# Patient Record
Sex: MALE | Race: WHITE | NOT HISPANIC OR LATINO | Employment: OTHER | URBAN - METROPOLITAN AREA
[De-identification: names, ages, dates, MRNs, and addresses within clinical notes are randomized per-mention and may not be internally consistent; named-entity substitution may affect disease eponyms.]

---

## 2024-03-31 ENCOUNTER — HOSPITAL ENCOUNTER (EMERGENCY)
Facility: CLINIC | Age: 49
Discharge: HOME | End: 2024-04-01
Attending: STUDENT IN AN ORGANIZED HEALTH CARE EDUCATION/TRAINING PROGRAM | Admitting: STUDENT IN AN ORGANIZED HEALTH CARE EDUCATION/TRAINING PROGRAM
Payer: COMMERCIAL

## 2024-03-31 ENCOUNTER — APPOINTMENT (OUTPATIENT)
Dept: GENERAL RADIOLOGY | Facility: CLINIC | Age: 49
End: 2024-03-31
Attending: STUDENT IN AN ORGANIZED HEALTH CARE EDUCATION/TRAINING PROGRAM
Payer: COMMERCIAL

## 2024-03-31 DIAGNOSIS — J10.1 URI DUE TO INFLUENZA A VIRUS: ICD-10-CM

## 2024-03-31 DIAGNOSIS — R06.6 HICCUPS: ICD-10-CM

## 2024-03-31 LAB
ALBUMIN SERPL BCG-MCNC: 4.4 G/DL (ref 3.5–5.2)
ALP SERPL-CCNC: 47 U/L (ref 40–150)
ALT SERPL W P-5'-P-CCNC: 44 U/L (ref 0–70)
ANION GAP SERPL CALCULATED.3IONS-SCNC: 14 MMOL/L (ref 7–15)
AST SERPL W P-5'-P-CCNC: 22 U/L (ref 0–45)
BASOPHILS # BLD AUTO: 0.1 10E3/UL (ref 0–0.2)
BASOPHILS NFR BLD AUTO: 1 %
BILIRUB SERPL-MCNC: 1.2 MG/DL
BUN SERPL-MCNC: 14.1 MG/DL (ref 6–20)
CALCIUM SERPL-MCNC: 9.1 MG/DL (ref 8.6–10)
CHLORIDE SERPL-SCNC: 103 MMOL/L (ref 98–107)
CREAT SERPL-MCNC: 1.26 MG/DL (ref 0.67–1.17)
DEPRECATED HCO3 PLAS-SCNC: 22 MMOL/L (ref 22–29)
EGFRCR SERPLBLD CKD-EPI 2021: 70 ML/MIN/1.73M2
EOSINOPHIL # BLD AUTO: 0 10E3/UL (ref 0–0.7)
EOSINOPHIL NFR BLD AUTO: 1 %
ERYTHROCYTE [DISTWIDTH] IN BLOOD BY AUTOMATED COUNT: 13.2 % (ref 10–15)
FLUAV RNA SPEC QL NAA+PROBE: POSITIVE
FLUBV RNA RESP QL NAA+PROBE: NEGATIVE
GLUCOSE SERPL-MCNC: 104 MG/DL (ref 70–99)
HCT VFR BLD AUTO: 52.9 % (ref 40–53)
HGB BLD-MCNC: 18.2 G/DL (ref 13.3–17.7)
IMM GRANULOCYTES # BLD: 0 10E3/UL
IMM GRANULOCYTES NFR BLD: 1 %
LIPASE SERPL-CCNC: 35 U/L (ref 13–60)
LYMPHOCYTES # BLD AUTO: 0.7 10E3/UL (ref 0.8–5.3)
LYMPHOCYTES NFR BLD AUTO: 9 %
MCH RBC QN AUTO: 30.5 PG (ref 26.5–33)
MCHC RBC AUTO-ENTMCNC: 34.4 G/DL (ref 31.5–36.5)
MCV RBC AUTO: 89 FL (ref 78–100)
MONOCYTES # BLD AUTO: 0.8 10E3/UL (ref 0–1.3)
MONOCYTES NFR BLD AUTO: 10 %
NEUTROPHILS # BLD AUTO: 6.5 10E3/UL (ref 1.6–8.3)
NEUTROPHILS NFR BLD AUTO: 78 %
NRBC # BLD AUTO: 0 10E3/UL
NRBC BLD AUTO-RTO: 0 /100
NT-PROBNP SERPL-MCNC: 55 PG/ML (ref 0–450)
PLATELET # BLD AUTO: 154 10E3/UL (ref 150–450)
POTASSIUM SERPL-SCNC: 4.1 MMOL/L (ref 3.4–5.3)
PROT SERPL-MCNC: 7.2 G/DL (ref 6.4–8.3)
RBC # BLD AUTO: 5.97 10E6/UL (ref 4.4–5.9)
RSV RNA SPEC NAA+PROBE: NEGATIVE
SARS-COV-2 RNA RESP QL NAA+PROBE: NEGATIVE
SODIUM SERPL-SCNC: 139 MMOL/L (ref 135–145)
TROPONIN T SERPL HS-MCNC: 11 NG/L
WBC # BLD AUTO: 8.1 10E3/UL (ref 4–11)

## 2024-03-31 PROCEDURE — 258N000003 HC RX IP 258 OP 636: Performed by: STUDENT IN AN ORGANIZED HEALTH CARE EDUCATION/TRAINING PROGRAM

## 2024-03-31 PROCEDURE — 85025 COMPLETE CBC W/AUTO DIFF WBC: CPT | Performed by: STUDENT IN AN ORGANIZED HEALTH CARE EDUCATION/TRAINING PROGRAM

## 2024-03-31 PROCEDURE — 84484 ASSAY OF TROPONIN QUANT: CPT | Performed by: STUDENT IN AN ORGANIZED HEALTH CARE EDUCATION/TRAINING PROGRAM

## 2024-03-31 PROCEDURE — 96372 THER/PROPH/DIAG INJ SC/IM: CPT | Mod: XS | Performed by: STUDENT IN AN ORGANIZED HEALTH CARE EDUCATION/TRAINING PROGRAM

## 2024-03-31 PROCEDURE — 96361 HYDRATE IV INFUSION ADD-ON: CPT

## 2024-03-31 PROCEDURE — 80053 COMPREHEN METABOLIC PANEL: CPT | Performed by: STUDENT IN AN ORGANIZED HEALTH CARE EDUCATION/TRAINING PROGRAM

## 2024-03-31 PROCEDURE — 96374 THER/PROPH/DIAG INJ IV PUSH: CPT

## 2024-03-31 PROCEDURE — 93005 ELECTROCARDIOGRAM TRACING: CPT

## 2024-03-31 PROCEDURE — 250N000011 HC RX IP 250 OP 636: Performed by: STUDENT IN AN ORGANIZED HEALTH CARE EDUCATION/TRAINING PROGRAM

## 2024-03-31 PROCEDURE — 71046 X-RAY EXAM CHEST 2 VIEWS: CPT

## 2024-03-31 PROCEDURE — 87637 SARSCOV2&INF A&B&RSV AMP PRB: CPT | Performed by: STUDENT IN AN ORGANIZED HEALTH CARE EDUCATION/TRAINING PROGRAM

## 2024-03-31 PROCEDURE — 99285 EMERGENCY DEPT VISIT HI MDM: CPT | Mod: 25

## 2024-03-31 PROCEDURE — 36415 COLL VENOUS BLD VENIPUNCTURE: CPT | Performed by: STUDENT IN AN ORGANIZED HEALTH CARE EDUCATION/TRAINING PROGRAM

## 2024-03-31 PROCEDURE — 83880 ASSAY OF NATRIURETIC PEPTIDE: CPT | Performed by: STUDENT IN AN ORGANIZED HEALTH CARE EDUCATION/TRAINING PROGRAM

## 2024-03-31 PROCEDURE — 83690 ASSAY OF LIPASE: CPT | Performed by: STUDENT IN AN ORGANIZED HEALTH CARE EDUCATION/TRAINING PROGRAM

## 2024-03-31 RX ORDER — BENZONATATE 100 MG/1
100 CAPSULE ORAL EVERY 8 HOURS PRN
Qty: 12 CAPSULE | Refills: 0 | Status: SHIPPED | OUTPATIENT
Start: 2024-03-31

## 2024-03-31 RX ORDER — CHLORPROMAZINE HYDROCHLORIDE 25 MG/ML
25 INJECTION INTRAMUSCULAR ONCE
Status: COMPLETED | OUTPATIENT
Start: 2024-03-31 | End: 2024-03-31

## 2024-03-31 RX ORDER — KETOROLAC TROMETHAMINE 15 MG/ML
15 INJECTION, SOLUTION INTRAMUSCULAR; INTRAVENOUS ONCE
Status: COMPLETED | OUTPATIENT
Start: 2024-03-31 | End: 2024-03-31

## 2024-03-31 RX ORDER — OSELTAMIVIR PHOSPHATE 75 MG/1
75 CAPSULE ORAL 2 TIMES DAILY
Qty: 10 CAPSULE | Refills: 0 | Status: SHIPPED | OUTPATIENT
Start: 2024-03-31 | End: 2024-04-05

## 2024-03-31 RX ADMIN — KETOROLAC TROMETHAMINE 15 MG: 15 INJECTION, SOLUTION INTRAMUSCULAR; INTRAVENOUS at 22:11

## 2024-03-31 RX ADMIN — SODIUM CHLORIDE 1000 ML: 9 INJECTION, SOLUTION INTRAVENOUS at 22:11

## 2024-03-31 RX ADMIN — CHLORPROMAZINE HYDROCHLORIDE 25 MG: 25 INJECTION INTRAMUSCULAR at 22:36

## 2024-03-31 ASSESSMENT — ACTIVITIES OF DAILY LIVING (ADL)
ADLS_ACUITY_SCORE: 35

## 2024-03-31 ASSESSMENT — COLUMBIA-SUICIDE SEVERITY RATING SCALE - C-SSRS
2. HAVE YOU ACTUALLY HAD ANY THOUGHTS OF KILLING YOURSELF IN THE PAST MONTH?: NO
6. HAVE YOU EVER DONE ANYTHING, STARTED TO DO ANYTHING, OR PREPARED TO DO ANYTHING TO END YOUR LIFE?: NO
1. IN THE PAST MONTH, HAVE YOU WISHED YOU WERE DEAD OR WISHED YOU COULD GO TO SLEEP AND NOT WAKE UP?: NO

## 2024-04-01 VITALS
SYSTOLIC BLOOD PRESSURE: 138 MMHG | BODY MASS INDEX: 34.9 KG/M2 | OXYGEN SATURATION: 92 % | RESPIRATION RATE: 20 BRPM | WEIGHT: 264.55 LBS | TEMPERATURE: 101 F | DIASTOLIC BLOOD PRESSURE: 69 MMHG | HEART RATE: 73 BPM

## 2024-04-01 LAB
ATRIAL RATE - MUSE: 73 BPM
DIASTOLIC BLOOD PRESSURE - MUSE: NORMAL MMHG
INTERPRETATION ECG - MUSE: NORMAL
P AXIS - MUSE: NORMAL DEGREES
PR INTERVAL - MUSE: 136 MS
QRS DURATION - MUSE: 96 MS
QT - MUSE: 350 MS
QTC - MUSE: 385 MS
R AXIS - MUSE: -36 DEGREES
SYSTOLIC BLOOD PRESSURE - MUSE: NORMAL MMHG
T AXIS - MUSE: -24 DEGREES
VENTRICULAR RATE- MUSE: 73 BPM

## 2024-04-01 NOTE — ED PROVIDER NOTES
History     Chief Complaint:  Fever, Chest Pain, and Shortness of Breath     HPI   Roland Daniel is a 48 year old male with a history of chronic back pain and asthma who presents with chest pain in his left chest for two days. He notes he has chest tightness, shortness of breath, and fever. He has had constant hiccups for two days. He has been taking Tylenol. He last took 4 max strength Tylenol two hours ago. He has had a fever of 100.3 temp. He has not taken 4 tablets of max Tylenol more than once. He denies nausea or vomiting. He takes Gabapentin for nerve pain. He denies having a history of reflux. No recent long travel, leg pain or swelling. No history of blood clot.     Independent Historian:   None - Patient Only    Review of External Notes:   None     Medications:    Gabapentin    Past Medical History:   Allergic to dogs   Asthma   Chronic back pain  Complex tear of medial meniscus of right knee as current injury   Elevated serum creatinine   Illicit drug use  Genital herpes   Lumbosacral spondylosis without myelopathy   Lumbar facet arthropathy   Scapular dyskinesis   Subacromial bursitis of left shoulder joint   Superior glenoid labrum lesion of left shoulder   Pedophilia   Pelvic floor dysfunction     Past Surgical History:    Shoulder surgery      Physical Exam   Patient Vitals for the past 24 hrs:   BP Temp Temp src Pulse Resp SpO2 Weight   03/31/24 2341 138/69 -- -- 73 -- 92 % --   03/31/24 2320 -- -- -- 80 -- 92 % --   03/31/24 2318 -- -- -- 77 -- 93 % --   03/31/24 2315 116/54 -- -- 81 -- -- --   03/31/24 2301 -- -- -- 72 -- 90 % --   03/31/24 2300 133/66 -- -- 72 -- -- --   03/31/24 2259 -- -- -- 85 -- 94 % --   03/31/24 2216 -- -- -- 71 -- 95 % --   03/31/24 2215 (!) 151/82 -- -- 69 -- -- --   03/31/24 2146 -- -- -- -- -- 97 % --   03/31/24 2145 (!) 157/87 -- -- 75 -- 96 % --   03/31/24 2129 -- -- -- 73 -- 94 % --   03/31/24 2114 (!) 148/81 -- -- 66 -- 95 % --   03/31/24 2101 -- -- -- 76 -- 93 %  --   03/31/24 2100 (!) 150/82 -- -- 80 -- 94 % --   03/31/24 2056 -- -- -- 75 -- 93 % --   03/31/24 2055 -- -- -- 72 -- 95 % --   03/31/24 2054 134/78 -- -- 80 -- -- --   03/31/24 1957 -- -- -- -- -- 94 % --   03/31/24 1950 (!) 165/139 (!) 101  F (38.3  C) Oral 94 20 -- 120 kg (264 lb 8.8 oz)      Physical Exam  General: Awake, alert, in no acute distress. Frequent hiccups.   HEENT: Atraumatic   EOM normal   External ears normal   Trachea midline  Neck: Supple, normal ROM  CV: Regular rate, regular rhythm   No murmur   No lower extremity edema  2+ radial and DP pulses  PULM: Dry cough. Breath sounds normal bilaterally  No wheezes or rales  ABD: Soft, non-tender, non-distended  Normal bowel sounds   No rebound or guarding   MSK: No gross deformities  NEURO: Alert, no focal deficits  Skin: Warm, dry and intact     Emergency Department Course   ECG  ECG taken at 2008  Sinus rhythm  Left axis deviation   Rate 73 bpm. DE interval 136 ms. QRS duration 96 ms. QT/QTc 350/385 ms. P-R-T axes * -36 -24.     Imaging:  Chest XR,  PA & LAT   Final Result   IMPRESSION: No focal airspace consolidation. Small nipple shadow at the left lung base. No pleural effusion or pneumothorax.      Cardiomediastinal silhouette is normal.         Laboratory:  Labs Ordered and Resulted from Time of ED Arrival to Time of ED Departure   INFLUENZA A/B, RSV, & SARS-COV2 PCR - Abnormal       Result Value    Influenza A PCR Positive (*)     Influenza B PCR Negative      RSV PCR Negative      SARS CoV2 PCR Negative     COMPREHENSIVE METABOLIC PANEL - Abnormal    Sodium 139      Potassium 4.1      Carbon Dioxide (CO2) 22      Anion Gap 14      Urea Nitrogen 14.1      Creatinine 1.26 (*)     GFR Estimate 70      Calcium 9.1      Chloride 103      Glucose 104 (*)     Alkaline Phosphatase 47      AST 22      ALT 44      Protein Total 7.2      Albumin 4.4      Bilirubin Total 1.2     CBC WITH PLATELETS AND DIFFERENTIAL - Abnormal    WBC Count 8.1      RBC  Count 5.97 (*)     Hemoglobin 18.2 (*)     Hematocrit 52.9      MCV 89      MCH 30.5      MCHC 34.4      RDW 13.2      Platelet Count 154      % Neutrophils 78      % Lymphocytes 9      % Monocytes 10      % Eosinophils 1      % Basophils 1      % Immature Granulocytes 1      NRBCs per 100 WBC 0      Absolute Neutrophils 6.5      Absolute Lymphocytes 0.7 (*)     Absolute Monocytes 0.8      Absolute Eosinophils 0.0      Absolute Basophils 0.1      Absolute Immature Granulocytes 0.0      Absolute NRBCs 0.0     TROPONIN T, HIGH SENSITIVITY - Normal    Troponin T, High Sensitivity 11     NT PROBNP INPATIENT - Normal    N terminal Pro BNP Inpatient 55     LIPASE - Normal    Lipase 35        Emergency Department Course & Assessments:    Interventions:  Medications   ketorolac (TORADOL) injection 15 mg (15 mg Intravenous $Given 3/31/24 2211)   sodium chloride 0.9% BOLUS 1,000 mL (0 mLs Intravenous Stopped 3/31/24 2353)   chlorproMAZINE (THORAZINE) injection 25 mg (25 mg Intramuscular $Given 3/31/24 2236)      Independent Interpretation (X-rays, CTs, rhythm strip):  I independently interpreted the patient's chest x-ray and noted no focal infiltrates.      Assessments/Consultations/Discussion of Management or Tests:   ED Course as of 04/01/24 0153   Sun Mar 31, 2024   2125 I obtained the patient's history and examined as noted above.    2323 I rechecked the patient and explained findings. His hiccups stopped. He was briefly hypoxic after the Thorazine but is saturating 94-95% on room air.      Social Determinants of Health affecting care:   None    Disposition:  The patient was discharged.     Impression & Plan    CMS Diagnoses: None    MIPS (If applicable):  N/A    Medical Decision Making:  Roland Daniel is a 48 year old male who presents for evaluation of cough, fever and myalgias.  They have other symptoms including chest tightness and pain as well as intractable hiccups.   Labs, EKG and chest x-ray as above are  reassuring without evidence of pericarditis, superimposed bacterial pneumonia, heart failure.  Hiccups abated with Thorazine.  Was briefly hypoxemic while asleep after Thorazine though was saturating 92% and above on room air.  He is in the window for Tamiflu and interested in treatment.  Otherwise continue supportive cares at home.  Prescription for Tessalon Perles provided.  Counseled extensively on safe dosing of Tylenol and ibuprofen.  Close followup of primary care physician is indicated and return to the ED for high fevers > 103 for more than 48 hours more, increasing productive cough, shortness of breath, or confusion.  There is no signs of serious bacterial infection such as bacteremia, meningitis, UTI/pyelonephritis, strep pharyngitis, etc.        Diagnosis:    ICD-10-CM    1. URI due to influenza A virus  J10.1       2. Hiccups  R06.6          Discharge Medications:  Discharge Medication List as of 3/31/2024 11:53 PM        START taking these medications    Details   benzonatate (TESSALON) 100 MG capsule Take 1 capsule (100 mg) by mouth every 8 hours as needed for cough, Disp-12 capsule, R-0, E-Prescribe      oseltamivir (TAMIFLU) 75 MG capsule Take 1 capsule (75 mg) by mouth 2 times daily for 5 days, Disp-10 capsule, R-0, E-Prescribe            Scribe Disclosure:  I, Nedia Membreno, am serving as a scribe at 8:51 PM on 3/31/2024 to document services personally performed by Cathy Freitas DO based on my observations and the provider's statements to me.      3/31/2024   Cathy Freitas DO Pappas Richter, Ellen, DO  04/01/24 0153

## 2024-04-01 NOTE — ED TRIAGE NOTES
Patient c/o being sick for the past few days, fever (103), SOB, cough and chest pain with intractable hiccups for the past 2 days. Patient is visibly sweaty and warm. ABCs intact.       Triage Assessment (Adult)       Row Name 03/31/24 1949          Triage Assessment    Airway WDL WDL        Respiratory WDL    Respiratory WDL WDL        Skin Circulation/Temperature WDL    Skin Circulation/Temperature WDL WDL        Cardiac WDL    Cardiac WDL WDL        Peripheral/Neurovascular WDL    Peripheral Neurovascular WDL WDL        Cognitive/Neuro/Behavioral WDL    Cognitive/Neuro/Behavioral WDL WDL